# Patient Record
Sex: FEMALE | URBAN - METROPOLITAN AREA
[De-identification: names, ages, dates, MRNs, and addresses within clinical notes are randomized per-mention and may not be internally consistent; named-entity substitution may affect disease eponyms.]

---

## 2017-01-18 NOTE — PATIENT DISCUSSION
PROM WITH HEM - ? 2ND RADIATION OR PROGRESSION - TO GET MEASUREMENTS, GET OLD RECORDS, AND THEN GET FA TO LOOK FOR RADIATION RETINOPAHTY

## 2017-01-26 NOTE — PATIENT DISCUSSION
PROB 2ND RADIATION - TO FOLLOW FOR GROWTH OR PROGRESSION - CONSIDER AVASTIN TX ON F/U - TO HOLD FOR NOW

## 2019-01-17 ENCOUNTER — IMPORTED ENCOUNTER (OUTPATIENT)
Dept: URBAN - METROPOLITAN AREA CLINIC 43 | Facility: CLINIC | Age: 64
End: 2019-01-17

## 2019-01-17 PROBLEM — H25.13: Noted: 2019-01-17

## 2019-06-06 ENCOUNTER — IMPORTED ENCOUNTER (OUTPATIENT)
Dept: URBAN - METROPOLITAN AREA CLINIC 43 | Facility: CLINIC | Age: 64
End: 2019-06-06

## 2019-06-18 ENCOUNTER — IMPORTED ENCOUNTER (OUTPATIENT)
Dept: URBAN - METROPOLITAN AREA CLINIC 43 | Facility: CLINIC | Age: 64
End: 2019-06-18

## 2019-06-18 ENCOUNTER — PREPPED CHART (OUTPATIENT)
Dept: URBAN - METROPOLITAN AREA CLINIC 32 | Facility: CLINIC | Age: 64
End: 2019-06-18

## 2019-06-18 PROBLEM — G43.109: Noted: 2019-06-18

## 2019-06-18 PROBLEM — H25.013: Noted: 2019-06-18

## 2019-06-20 ENCOUNTER — IMPORTED ENCOUNTER (OUTPATIENT)
Dept: URBAN - METROPOLITAN AREA CLINIC 43 | Facility: CLINIC | Age: 64
End: 2019-06-20

## 2019-09-05 ENCOUNTER — IMPORTED ENCOUNTER (OUTPATIENT)
Dept: URBAN - METROPOLITAN AREA CLINIC 43 | Facility: CLINIC | Age: 64
End: 2019-09-05

## 2019-12-18 ASSESSMENT — TONOMETRY
OD_IOP_MMHG: 17
OS_IOP_MMHG: 14

## 2019-12-18 ASSESSMENT — VISUAL ACUITY
OD_CC: 20/30
OS_CC: 20/25

## 2019-12-19 ENCOUNTER — CONTACT LENS FOLLOW UP (OUTPATIENT)
Dept: URBAN - METROPOLITAN AREA CLINIC 32 | Facility: CLINIC | Age: 64
End: 2019-12-19

## 2019-12-19 DIAGNOSIS — G43.109: ICD-10-CM

## 2019-12-19 PROCEDURE — 92310F

## 2020-01-27 ENCOUNTER — CONTACT LENS FOLLOW UP (OUTPATIENT)
Dept: URBAN - METROPOLITAN AREA CLINIC 32 | Facility: CLINIC | Age: 65
End: 2020-01-27

## 2020-01-27 DIAGNOSIS — H25.13: ICD-10-CM

## 2020-01-27 PROCEDURE — 92310F

## 2020-04-19 ASSESSMENT — VISUAL ACUITY
OD_CC: J1
OS_CC: 20/20
OS_SC: 20/200
OD_SC: 20/200
OD_CC: 20/30
OD_CC: 20/25
OS_CC: 20/25
OD_OTHER: 20/30.
OD_CC: J1
OS_CC: 20/25
OS_CC: J1
OS_OTHER: 20/30.

## 2020-04-19 ASSESSMENT — KERATOMETRY
OD_AXISANGLE2_DEGREES: 30
OS_AXISANGLE2_DEGREES: 155
OD_K1POWER_DIOPTERS: 44.25
OS_K1POWER_DIOPTERS: 43.75
OS_K2POWER_DIOPTERS: 42.75
OD_AXISANGLE_DEGREES: 120
OD_K2POWER_DIOPTERS: 43.75
OS_AXISANGLE_DEGREES: 65

## 2020-04-19 ASSESSMENT — TONOMETRY
OD_IOP_MMHG: 17.0
OS_IOP_MMHG: 12.0
OS_IOP_MMHG: 14.0
OD_IOP_MMHG: 13.0

## 2021-02-16 ENCOUNTER — CONTACT LENS EXAM ESTABLISHED (OUTPATIENT)
Dept: URBAN - METROPOLITAN AREA CLINIC 32 | Facility: CLINIC | Age: 66
End: 2021-02-16

## 2021-02-16 DIAGNOSIS — H25.13: ICD-10-CM

## 2021-02-16 DIAGNOSIS — H52.4: ICD-10-CM

## 2021-02-16 DIAGNOSIS — H52.03: ICD-10-CM

## 2021-02-16 DIAGNOSIS — H25.013: ICD-10-CM

## 2021-02-16 PROCEDURE — 92014 COMPRE OPH EXAM EST PT 1/>: CPT

## 2021-02-16 PROCEDURE — 92310-3 LEVEL 3 CONTACT LENS MANAGEMENT

## 2021-02-16 PROCEDURE — 92015 DETERMINE REFRACTIVE STATE: CPT

## 2021-02-16 ASSESSMENT — VISUAL ACUITY
OD_CC: 20/25+3
OS_CC: 20/60-1
OD_CC: J16
OS_CC: J1+
OS_PH: 20/30

## 2021-02-16 ASSESSMENT — TONOMETRY
OD_IOP_MMHG: 15
OS_IOP_MMHG: 17

## 2021-03-09 ENCOUNTER — CONTACT LENS FOLLOW UP (OUTPATIENT)
Dept: URBAN - METROPOLITAN AREA CLINIC 32 | Facility: CLINIC | Age: 66
End: 2021-03-09

## 2021-03-09 DIAGNOSIS — H25.013: ICD-10-CM

## 2021-03-09 DIAGNOSIS — H25.13: ICD-10-CM

## 2021-03-09 DIAGNOSIS — H52.4: ICD-10-CM

## 2021-03-09 DIAGNOSIS — H52.03: ICD-10-CM

## 2021-03-09 PROCEDURE — 92310F

## 2022-01-17 ENCOUNTER — COMPREHENSIVE EXAM (OUTPATIENT)
Dept: URBAN - METROPOLITAN AREA CLINIC 32 | Facility: CLINIC | Age: 67
End: 2022-01-17

## 2022-01-17 DIAGNOSIS — H25.13: ICD-10-CM

## 2022-01-17 DIAGNOSIS — H25.013: ICD-10-CM

## 2022-01-17 DIAGNOSIS — H02.886: ICD-10-CM

## 2022-01-17 DIAGNOSIS — H52.4: ICD-10-CM

## 2022-01-17 DIAGNOSIS — H02.883: ICD-10-CM

## 2022-01-17 DIAGNOSIS — H52.03: ICD-10-CM

## 2022-01-17 PROCEDURE — 92012 INTRM OPH EXAM EST PATIENT: CPT

## 2022-01-17 PROCEDURE — 92015 DETERMINE REFRACTIVE STATE: CPT

## 2022-01-17 PROCEDURE — 92310-3N NEW CL PATIENT MULTIFOCAL AND/OR MONOVISION SOFT LENS EVALUATION

## 2022-01-17 ASSESSMENT — TONOMETRY
OS_IOP_MMHG: 15
OD_IOP_MMHG: 16

## 2022-01-17 ASSESSMENT — VISUAL ACUITY
OS_CC: J3-2
OU_CC: 20/25
OU_CC: J3
OD_CC: 20/25

## 2022-01-27 ENCOUNTER — CONTACT LENSES/GLASSES VISIT (OUTPATIENT)
Dept: URBAN - METROPOLITAN AREA CLINIC 32 | Facility: CLINIC | Age: 67
End: 2022-01-27

## 2022-01-27 DIAGNOSIS — H52.4: ICD-10-CM

## 2022-01-27 DIAGNOSIS — H52.03: ICD-10-CM

## 2022-01-27 PROCEDURE — 92310F

## 2022-04-11 ENCOUNTER — EMERGENCY VISIT (OUTPATIENT)
Dept: URBAN - METROPOLITAN AREA CLINIC 32 | Facility: CLINIC | Age: 67
End: 2022-04-11

## 2022-04-11 DIAGNOSIS — H10.45: ICD-10-CM

## 2022-04-11 PROCEDURE — 92012 INTRM OPH EXAM EST PATIENT: CPT

## 2022-04-11 ASSESSMENT — VISUAL ACUITY
OD_CC: 20/25
OS_CC: 20/25

## 2022-06-15 ENCOUNTER — EMERGENCY VISIT (OUTPATIENT)
Dept: URBAN - METROPOLITAN AREA CLINIC 32 | Facility: CLINIC | Age: 67
End: 2022-06-15

## 2022-06-15 DIAGNOSIS — H02.886: ICD-10-CM

## 2022-06-15 DIAGNOSIS — H02.883: ICD-10-CM

## 2022-06-15 DIAGNOSIS — H10.45: ICD-10-CM

## 2022-06-15 PROCEDURE — 99212 OFFICE O/P EST SF 10 MIN: CPT

## 2022-06-15 ASSESSMENT — VISUAL ACUITY
OD_SC: 20/25-2
OS_SC: 20/20

## 2022-06-15 ASSESSMENT — TONOMETRY
OS_IOP_MMHG: 14
OD_IOP_MMHG: 13

## 2023-04-26 ENCOUNTER — COMPREHENSIVE EXAM (OUTPATIENT)
Dept: URBAN - METROPOLITAN AREA CLINIC 32 | Facility: CLINIC | Age: 68
End: 2023-04-26

## 2023-04-26 DIAGNOSIS — H25.13: ICD-10-CM

## 2023-04-26 DIAGNOSIS — H52.203: ICD-10-CM

## 2023-04-26 DIAGNOSIS — H04.123: ICD-10-CM

## 2023-04-26 PROCEDURE — 92014 COMPRE OPH EXAM EST PT 1/>: CPT

## 2023-04-26 PROCEDURE — 92310-3 LEVEL 3 CONTACT LENS MANAGEMENT

## 2023-04-26 PROCEDURE — 92015 DETERMINE REFRACTIVE STATE: CPT

## 2023-04-26 ASSESSMENT — VISUAL ACUITY
OS_CC: 20/40
OD_CC: 20/25-2
OU_CC: J1
OD_CC: J2
OS_CC: J2
OU_CC: 20/25

## 2023-04-26 ASSESSMENT — TONOMETRY
OD_IOP_MMHG: 14
OS_IOP_MMHG: 16

## 2023-08-02 ENCOUNTER — PREPPED CHART (OUTPATIENT)
Dept: URBAN - METROPOLITAN AREA CLINIC 32 | Facility: CLINIC | Age: 68
End: 2023-08-02

## 2023-08-02 DIAGNOSIS — H25.13: ICD-10-CM

## 2023-08-02 DIAGNOSIS — H52.203: ICD-10-CM

## 2023-08-02 PROCEDURE — 92015GRNC REFRACTION GLASSES RECHECK - NO CHARGE

## 2023-08-16 ENCOUNTER — TECH ONLY (OUTPATIENT)
Dept: URBAN - METROPOLITAN AREA CLINIC 32 | Facility: CLINIC | Age: 68
End: 2023-08-16

## 2023-08-16 DIAGNOSIS — H52.203: ICD-10-CM

## 2023-08-16 DIAGNOSIS — H25.13: ICD-10-CM

## 2023-08-16 PROCEDURE — 99211T TECH SERVICE

## 2023-08-16 ASSESSMENT — VISUAL ACUITY
OS_CC: 20/30
OD_CC: 20/25

## 2025-04-16 ENCOUNTER — COMPREHENSIVE EXAM (OUTPATIENT)
Age: 70
End: 2025-04-16

## 2025-04-16 DIAGNOSIS — H52.203: ICD-10-CM

## 2025-04-16 PROCEDURE — 92014 COMPRE OPH EXAM EST PT 1/>: CPT

## 2025-04-16 PROCEDURE — 92015 DETERMINE REFRACTIVE STATE: CPT

## 2025-04-29 ENCOUNTER — CONTACT LENSES/GLASSES VISIT (OUTPATIENT)
Age: 70
End: 2025-04-29

## 2025-04-29 DIAGNOSIS — H52.203: ICD-10-CM

## 2025-04-29 PROCEDURE — 92310-3 LEVEL 3 SOFT LENS UPDATE
